# Patient Record
Sex: MALE | Race: WHITE
[De-identification: names, ages, dates, MRNs, and addresses within clinical notes are randomized per-mention and may not be internally consistent; named-entity substitution may affect disease eponyms.]

---

## 2022-05-29 ENCOUNTER — HOSPITAL ENCOUNTER (EMERGENCY)
Dept: HOSPITAL 76 - ED | Age: 32
Discharge: HOME | End: 2022-05-29
Payer: COMMERCIAL

## 2022-05-29 VITALS — SYSTOLIC BLOOD PRESSURE: 125 MMHG | DIASTOLIC BLOOD PRESSURE: 75 MMHG
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
## 2022-05-29 DIAGNOSIS — S63.502A: Primary | ICD-10-CM

## 2022-05-29 PROCEDURE — 99282 EMERGENCY DEPT VISIT SF MDM: CPT

## 2022-05-29 PROCEDURE — 99283 EMERGENCY DEPT VISIT LOW MDM: CPT

## 2022-05-29 PROCEDURE — 73110 X-RAY EXAM OF WRIST: CPT

## 2022-05-29 NOTE — XRAY REPORT
PROCEDURE:  Wrist 4 View LT

 

INDICATIONS: Trauma

 

TECHNIQUE:  4 views of the wrist were acquired.  

 

COMPARISON:  None

 

FINDINGS:  

 

Bones:  No fractures or dislocations.  No suspicious bony lesions.  

 

Scaphoid view:  Negative

 

Soft tissues:  No suspicious soft tissue calcifications.  

 

IMPRESSION:  

No acute fracture. No osseous lesion. If symptoms and/or clinical suspicion for pathology continue, f
urther assessment with repeat plain films, or advanced imaging (e.g., CT, MRI, or bone scan) is recom
mended for further assessment.

 

Reviewed by: Tylor Carlos MD on 5/29/2022 9:44 AM PDT

Approved by: Tylor Carlos MD on 5/29/2022 9:44 AM PDT

 

 

Station ID:  [id number]

## 2022-05-29 NOTE — ED PHYSICIAN DOCUMENTATION
PD HPI UPPER EXT INJURY





- Stated complaint


Stated Complaint: LT WRIST PX





- Chief complaint


Chief Complaint: Ext Problem





- History obtained from


History obtained from: Patient





- History of Present Illness


Location: Left, Wrist


Type of injury: Twist (lifting heavy object, felt crack in wrist, with pain on 

ROM.)


Timing - onset: Yesterday


Timing - duration: Days (1)


Timing - details: Abrupt onset, Still present


Worsened by: Moving, Palpating


Associated symptoms: Swelling (mild dorsal ulnar aspect of wrist.).  No: 

Weakness, Numbness, Discolored


Similar symptoms before: Has not had sx before





Review of Systems


Constitutional: denies: Fever, Chills


Nose: denies: Rhinorrhea / runny nose, Congestion


Throat: denies: Sore throat


Respiratory: denies: Cough


Skin: denies: Rash, Lesions


Neurologic: denies: Focal weakness, Numbness





PD PAST MEDICAL HISTORY





- Past Medical History


Cardiovascular: None


Respiratory: None


Musculoskeletal: None





- Present Medications


Home Medications: 


                                Ambulatory Orders











 Medication  Instructions  Recorded  Confirmed


 


No Known Home Medications  05/29/22 05/29/22














- Allergies


Allergies/Adverse Reactions: 


                                    Allergies











Allergy/AdvReac Type Severity Reaction Status Date / Time


 


No Known Drug Allergies Allergy   Verified 05/29/22 08:49














PD ED PE NORMAL





- Vitals


Vital signs reviewed: Yes





- General


General: Alert and oriented X 3, No acute distress, Well developed/nourished





- Derm


Derm: Normal color, Warm and dry, No rash





- Extremities


Extremities: Other (left wrist with tenderness dorsal ulnar aspect. No noted 

laxity on ROM of the wrist. Snuffbox not tender. )





- Neuro


Neuro: No motor deficit, No sensory deficit





Results





- Vitals


Vitals: 


                               Vital Signs - 24 hr











  05/29/22 05/29/22





  08:50 10:14


 


Temperature 37.0 C 


 


Heart Rate 68 75


 


Respiratory 19 18





Rate  


 


Blood Pressure 131/75 H 125/75


 


O2 Saturation 97 99








                                     Oxygen











O2 Source                      Room air

















- Rads (name of study)


  ** left wrist


Radiology: Prelim report reviewed (no fracture nor acute bony abnormality.), See

rad report





PD MEDICAL DECISION MAKING





- ED course


Complexity details: reviewed results, considered differential (xray normal. 

Presume strain of wrist. Can treat with velcro spint, NSAIDs, less use. ), d/w 

patient





Departure





- Departure


Disposition: 01 Home, Self Care


Clinical Impression: 


Left wrist sprain


Qualifiers:


 Encounter type: initial encounter Qualified Code(s): S63.502A - Unspecified 

sprain of left wrist, initial encounter





Condition: Stable


Record reviewed to determine appropriate education?: Yes


Instructions:  ED Sprain Wrist


Follow-Up: 


RODO PERDOMO III, MD [Primary Care Provider] - 


Comments: 


Your x-ray appears normal without any fractures or dislocations.  Presume you 

have a sprain of some of the ligaments or muscle tendons in the wrist.  This 

should improve with time and protection.  Use Velcro wrist splint for the next 

several days to week to help guard motion at the wrist.  Light activity only 

with the wrist for the next several days to week.





Consider anti-inflammatory such as ibuprofen 400 to 600 mg 3 times daily with 

food for the next week.





Recheck if not improving well over the next week and resolved by 7 to 10 days.  

At that point resume normal activity if doing okay


Forms:  Activity restrictions


Discharge Date/Time: 05/29/22 10:15